# Patient Record
Sex: MALE | Race: WHITE | URBAN - METROPOLITAN AREA
[De-identification: names, ages, dates, MRNs, and addresses within clinical notes are randomized per-mention and may not be internally consistent; named-entity substitution may affect disease eponyms.]

---

## 2022-07-22 ENCOUNTER — APPOINTMENT (OUTPATIENT)
Dept: GENERAL RADIOLOGY | Age: 22
DRG: 812 | End: 2022-07-22
Payer: MEDICAID

## 2022-07-22 ENCOUNTER — HOSPITAL ENCOUNTER (INPATIENT)
Age: 22
LOS: 2 days | Discharge: HOME | DRG: 812 | End: 2022-07-24
Attending: EMERGENCY MEDICINE | Admitting: FAMILY MEDICINE
Payer: MEDICAID

## 2022-07-22 ENCOUNTER — APPOINTMENT (OUTPATIENT)
Dept: CT IMAGING | Age: 22
DRG: 812 | End: 2022-07-22
Payer: MEDICAID

## 2022-07-22 DIAGNOSIS — R09.02 HYPOXIA: ICD-10-CM

## 2022-07-22 DIAGNOSIS — T50.901A ACCIDENTAL DRUG OVERDOSE, INITIAL ENCOUNTER: Primary | ICD-10-CM

## 2022-07-22 DIAGNOSIS — R73.9 HYPERGLYCEMIA: ICD-10-CM

## 2022-07-22 PROBLEM — J69.0 ASPIRATION PNEUMONIA DUE TO INHALATION OF VOMITUS (HCC): Status: ACTIVE | Noted: 2022-07-22

## 2022-07-22 LAB
ACETAMINOPHEN LEVEL: <5 MCG/ML (ref 10–30)
ALBUMIN SERPL-MCNC: 4.5 G/DL (ref 3.5–5.2)
ALP BLD-CCNC: 127 U/L (ref 40–129)
ALT SERPL-CCNC: 23 U/L (ref 0–40)
AMPHETAMINE SCREEN, URINE: NOT DETECTED
ANION GAP SERPL CALCULATED.3IONS-SCNC: 10 MMOL/L (ref 7–16)
AST SERPL-CCNC: 26 U/L (ref 0–39)
B.E.: -1.2 MMOL/L (ref -3–3)
BARBITURATE SCREEN URINE: NOT DETECTED
BASOPHILS ABSOLUTE: 0.04 E9/L (ref 0–0.2)
BASOPHILS RELATIVE PERCENT: 0.3 % (ref 0–2)
BENZODIAZEPINE SCREEN, URINE: NOT DETECTED
BILIRUB SERPL-MCNC: 0.4 MG/DL (ref 0–1.2)
BUN BLDV-MCNC: 16 MG/DL (ref 6–20)
CALCIUM SERPL-MCNC: 8.6 MG/DL (ref 8.6–10.2)
CANNABINOID SCREEN URINE: NOT DETECTED
CHLORIDE BLD-SCNC: 96 MMOL/L (ref 98–107)
CO2: 27 MMOL/L (ref 22–29)
COCAINE METABOLITE SCREEN URINE: NOT DETECTED
COHB: 0.9 % (ref 0–1.5)
CREAT SERPL-MCNC: 1.6 MG/DL (ref 0.7–1.2)
CRITICAL: ABNORMAL
DATE ANALYZED: ABNORMAL
DATE OF COLLECTION: ABNORMAL
EKG ATRIAL RATE: 122 BPM
EKG P AXIS: 55 DEGREES
EKG P-R INTERVAL: 136 MS
EKG Q-T INTERVAL: 322 MS
EKG QRS DURATION: 100 MS
EKG QTC CALCULATION (BAZETT): 458 MS
EKG R AXIS: 75 DEGREES
EKG T AXIS: 47 DEGREES
EKG VENTRICULAR RATE: 122 BPM
EOSINOPHILS ABSOLUTE: 0.04 E9/L (ref 0.05–0.5)
EOSINOPHILS RELATIVE PERCENT: 0.3 % (ref 0–6)
ETHANOL: <10 MG/DL (ref 0–0.08)
FENTANYL SCREEN, URINE: POSITIVE
GFR AFRICAN AMERICAN: >60
GFR NON-AFRICAN AMERICAN: 53 ML/MIN/1.73
GLUCOSE BLD-MCNC: 386 MG/DL (ref 74–99)
HBA1C MFR BLD: 5.3 % (ref 4–5.6)
HCO3: 23.2 MMOL/L (ref 22–26)
HCT VFR BLD CALC: 49.7 % (ref 37–54)
HEMOGLOBIN: 16.5 G/DL (ref 12.5–16.5)
HHB: 3 % (ref 0–5)
IMMATURE GRANULOCYTES #: 0.13 E9/L
IMMATURE GRANULOCYTES %: 0.8 % (ref 0–5)
LAB: ABNORMAL
LACTIC ACID, SEPSIS: 1.2 MMOL/L (ref 0.5–1.9)
LACTIC ACID, SEPSIS: 2.9 MMOL/L (ref 0.5–1.9)
LYMPHOCYTES ABSOLUTE: 1.64 E9/L (ref 1.5–4)
LYMPHOCYTES RELATIVE PERCENT: 10.4 % (ref 20–42)
Lab: ABNORMAL
Lab: ABNORMAL
MCH RBC QN AUTO: 30.6 PG (ref 26–35)
MCHC RBC AUTO-ENTMCNC: 33.2 % (ref 32–34.5)
MCV RBC AUTO: 92.2 FL (ref 80–99.9)
METHADONE SCREEN, URINE: NOT DETECTED
METHB: 0.4 % (ref 0–1.5)
MODE: ABNORMAL
MONOCYTES ABSOLUTE: 0.39 E9/L (ref 0.1–0.95)
MONOCYTES RELATIVE PERCENT: 2.5 % (ref 2–12)
NEUTROPHILS ABSOLUTE: 13.49 E9/L (ref 1.8–7.3)
NEUTROPHILS RELATIVE PERCENT: 85.7 % (ref 43–80)
O2 CONTENT: 22.6 ML/DL
O2 SATURATION: 97 % (ref 92–98.5)
O2HB: 95.7 % (ref 94–97)
OPERATOR ID: 1721
OPIATE SCREEN URINE: NOT DETECTED
OXYCODONE URINE: NOT DETECTED
PATIENT TEMP: 37 C
PCO2: 38.3 MMHG (ref 35–45)
PDW BLD-RTO: 11.9 FL (ref 11.5–15)
PH BLOOD GAS: 7.4 (ref 7.35–7.45)
PHENCYCLIDINE SCREEN URINE: NOT DETECTED
PLATELET # BLD: 200 E9/L (ref 130–450)
PMV BLD AUTO: 9.8 FL (ref 7–12)
PO2: 93.4 MMHG (ref 75–100)
POTASSIUM SERPL-SCNC: 3.97 MMOL/L (ref 3.5–5)
POTASSIUM SERPL-SCNC: 4 MMOL/L (ref 3.5–5)
POTASSIUM SERPL-SCNC: 6.4 MMOL/L (ref 3.5–5)
RBC # BLD: 5.39 E12/L (ref 3.8–5.8)
SALICYLATE, SERUM: <0.3 MG/DL (ref 0–30)
SODIUM BLD-SCNC: 133 MMOL/L (ref 132–146)
SOURCE, BLOOD GAS: ABNORMAL
THB: 16.8 G/DL (ref 11.5–16.5)
TIME ANALYZED: 228
TOTAL PROTEIN: 7.6 G/DL (ref 6.4–8.3)
TRICYCLIC ANTIDEPRESSANTS SCREEN SERUM: NEGATIVE NG/ML
TROPONIN, HIGH SENSITIVITY: 15 NG/L (ref 0–11)
WBC # BLD: 15.7 E9/L (ref 4.5–11.5)

## 2022-07-22 PROCEDURE — 93005 ELECTROCARDIOGRAM TRACING: CPT | Performed by: EMERGENCY MEDICINE

## 2022-07-22 PROCEDURE — 2580000003 HC RX 258: Performed by: FAMILY MEDICINE

## 2022-07-22 PROCEDURE — 70450 CT HEAD/BRAIN W/O DYE: CPT

## 2022-07-22 PROCEDURE — 82805 BLOOD GASES W/O2 SATURATION: CPT

## 2022-07-22 PROCEDURE — 80143 DRUG ASSAY ACETAMINOPHEN: CPT

## 2022-07-22 PROCEDURE — 84132 ASSAY OF SERUM POTASSIUM: CPT

## 2022-07-22 PROCEDURE — 6360000002 HC RX W HCPCS: Performed by: EMERGENCY MEDICINE

## 2022-07-22 PROCEDURE — 87186 SC STD MICRODIL/AGAR DIL: CPT

## 2022-07-22 PROCEDURE — 2140000000 HC CCU INTERMEDIATE R&B

## 2022-07-22 PROCEDURE — 6360000002 HC RX W HCPCS: Performed by: FAMILY MEDICINE

## 2022-07-22 PROCEDURE — 80307 DRUG TEST PRSMV CHEM ANLYZR: CPT

## 2022-07-22 PROCEDURE — 2580000003 HC RX 258: Performed by: EMERGENCY MEDICINE

## 2022-07-22 PROCEDURE — 85025 COMPLETE CBC W/AUTO DIFF WBC: CPT

## 2022-07-22 PROCEDURE — 72125 CT NECK SPINE W/O DYE: CPT

## 2022-07-22 PROCEDURE — 87150 DNA/RNA AMPLIFIED PROBE: CPT

## 2022-07-22 PROCEDURE — 80053 COMPREHEN METABOLIC PANEL: CPT

## 2022-07-22 PROCEDURE — 96365 THER/PROPH/DIAG IV INF INIT: CPT

## 2022-07-22 PROCEDURE — 83605 ASSAY OF LACTIC ACID: CPT

## 2022-07-22 PROCEDURE — 84484 ASSAY OF TROPONIN QUANT: CPT

## 2022-07-22 PROCEDURE — 87077 CULTURE AEROBIC IDENTIFY: CPT

## 2022-07-22 PROCEDURE — 36415 COLL VENOUS BLD VENIPUNCTURE: CPT

## 2022-07-22 PROCEDURE — 99285 EMERGENCY DEPT VISIT HI MDM: CPT

## 2022-07-22 PROCEDURE — 80179 DRUG ASSAY SALICYLATE: CPT

## 2022-07-22 PROCEDURE — 6370000000 HC RX 637 (ALT 250 FOR IP): Performed by: FAMILY MEDICINE

## 2022-07-22 PROCEDURE — 87040 BLOOD CULTURE FOR BACTERIA: CPT

## 2022-07-22 PROCEDURE — 82077 ASSAY SPEC XCP UR&BREATH IA: CPT

## 2022-07-22 PROCEDURE — 71045 X-RAY EXAM CHEST 1 VIEW: CPT

## 2022-07-22 PROCEDURE — 83036 HEMOGLOBIN GLYCOSYLATED A1C: CPT

## 2022-07-22 RX ORDER — POLYETHYLENE GLYCOL 3350 17 G/17G
17 POWDER, FOR SOLUTION ORAL DAILY PRN
Status: DISCONTINUED | OUTPATIENT
Start: 2022-07-22 | End: 2022-07-24 | Stop reason: HOSPADM

## 2022-07-22 RX ORDER — ACETAMINOPHEN 325 MG/1
650 TABLET ORAL EVERY 6 HOURS PRN
Status: DISCONTINUED | OUTPATIENT
Start: 2022-07-22 | End: 2022-07-24 | Stop reason: HOSPADM

## 2022-07-22 RX ORDER — INSULIN LISPRO 100 [IU]/ML
0-8 INJECTION, SOLUTION INTRAVENOUS; SUBCUTANEOUS
Status: DISCONTINUED | OUTPATIENT
Start: 2022-07-22 | End: 2022-07-24 | Stop reason: HOSPADM

## 2022-07-22 RX ORDER — INSULIN GLARGINE-YFGN 100 [IU]/ML
0.25 INJECTION, SOLUTION SUBCUTANEOUS NIGHTLY
Status: DISCONTINUED | OUTPATIENT
Start: 2022-07-22 | End: 2022-07-24 | Stop reason: HOSPADM

## 2022-07-22 RX ORDER — ACETAMINOPHEN 650 MG/1
650 SUPPOSITORY RECTAL EVERY 6 HOURS PRN
Status: DISCONTINUED | OUTPATIENT
Start: 2022-07-22 | End: 2022-07-24 | Stop reason: HOSPADM

## 2022-07-22 RX ORDER — DEXTROSE MONOHYDRATE 100 MG/ML
INJECTION, SOLUTION INTRAVENOUS CONTINUOUS PRN
Status: DISCONTINUED | OUTPATIENT
Start: 2022-07-22 | End: 2022-07-24 | Stop reason: HOSPADM

## 2022-07-22 RX ORDER — PROMETHAZINE HYDROCHLORIDE 12.5 MG/1
12.5 TABLET ORAL EVERY 6 HOURS PRN
Status: DISCONTINUED | OUTPATIENT
Start: 2022-07-22 | End: 2022-07-24 | Stop reason: HOSPADM

## 2022-07-22 RX ORDER — INSULIN LISPRO 100 [IU]/ML
0-4 INJECTION, SOLUTION INTRAVENOUS; SUBCUTANEOUS NIGHTLY
Status: DISCONTINUED | OUTPATIENT
Start: 2022-07-22 | End: 2022-07-24 | Stop reason: HOSPADM

## 2022-07-22 RX ORDER — SODIUM CHLORIDE 9 MG/ML
INJECTION, SOLUTION INTRAVENOUS PRN
Status: DISCONTINUED | OUTPATIENT
Start: 2022-07-22 | End: 2022-07-24 | Stop reason: HOSPADM

## 2022-07-22 RX ORDER — SODIUM CHLORIDE 0.9 % (FLUSH) 0.9 %
10 SYRINGE (ML) INJECTION EVERY 12 HOURS SCHEDULED
Status: DISCONTINUED | OUTPATIENT
Start: 2022-07-22 | End: 2022-07-24 | Stop reason: HOSPADM

## 2022-07-22 RX ORDER — 0.9 % SODIUM CHLORIDE 0.9 %
500 INTRAVENOUS SOLUTION INTRAVENOUS ONCE
Status: COMPLETED | OUTPATIENT
Start: 2022-07-22 | End: 2022-07-22

## 2022-07-22 RX ORDER — SODIUM CHLORIDE 0.9 % (FLUSH) 0.9 %
10 SYRINGE (ML) INJECTION PRN
Status: DISCONTINUED | OUTPATIENT
Start: 2022-07-22 | End: 2022-07-24 | Stop reason: HOSPADM

## 2022-07-22 RX ORDER — ENOXAPARIN SODIUM 100 MG/ML
30 INJECTION SUBCUTANEOUS 2 TIMES DAILY
Status: DISCONTINUED | OUTPATIENT
Start: 2022-07-22 | End: 2022-07-24 | Stop reason: HOSPADM

## 2022-07-22 RX ORDER — ONDANSETRON 2 MG/ML
4 INJECTION INTRAMUSCULAR; INTRAVENOUS EVERY 6 HOURS PRN
Status: DISCONTINUED | OUTPATIENT
Start: 2022-07-22 | End: 2022-07-24 | Stop reason: HOSPADM

## 2022-07-22 RX ADMIN — SODIUM CHLORIDE 3000 MG: 900 INJECTION INTRAVENOUS at 23:40

## 2022-07-22 RX ADMIN — SODIUM CHLORIDE 3000 MG: 900 INJECTION INTRAVENOUS at 14:43

## 2022-07-22 RX ADMIN — ENOXAPARIN SODIUM 30 MG: 100 INJECTION SUBCUTANEOUS at 09:43

## 2022-07-22 RX ADMIN — SODIUM CHLORIDE, PRESERVATIVE FREE 10 ML: 5 INJECTION INTRAVENOUS at 21:06

## 2022-07-22 RX ADMIN — ENOXAPARIN SODIUM 30 MG: 100 INJECTION SUBCUTANEOUS at 21:06

## 2022-07-22 RX ADMIN — AMPICILLIN SODIUM AND SULBACTAM SODIUM 3000 MG: 2; 1 INJECTION, POWDER, FOR SOLUTION INTRAMUSCULAR; INTRAVENOUS at 02:51

## 2022-07-22 RX ADMIN — SODIUM CHLORIDE 500 ML: 9 INJECTION, SOLUTION INTRAVENOUS at 03:28

## 2022-07-22 RX ADMIN — ACETAMINOPHEN 650 MG: 325 TABLET ORAL at 15:12

## 2022-07-22 RX ADMIN — SODIUM CHLORIDE 40 ML: 9 INJECTION, SOLUTION INTRAVENOUS at 23:39

## 2022-07-22 RX ADMIN — SODIUM CHLORIDE 3000 MG: 900 INJECTION INTRAVENOUS at 09:43

## 2022-07-22 ASSESSMENT — PAIN SCALES - GENERAL: PAINLEVEL_OUTOF10: 6

## 2022-07-22 ASSESSMENT — PAIN - FUNCTIONAL ASSESSMENT: PAIN_FUNCTIONAL_ASSESSMENT: NONE - DENIES PAIN

## 2022-07-22 ASSESSMENT — PAIN DESCRIPTION - LOCATION: LOCATION: HEAD

## 2022-07-22 NOTE — ED NOTES
Assumed care of patient. Patient resting comfortably, denies complaints, lunch tray given.      Jessica Tipton RN  07/22/22 9941

## 2022-07-22 NOTE — PROGRESS NOTES
Hospitalist Progress Note      SYNOPSIS: Patient admitted on 7/22/2022 for fentanyl overdose and aspiration pneumonia. Mr. Andrew Byrne, a 23y.o. year old male  who  has no past medical history on file. Presented to the emergency department from local long-term for fentanyl overdose. Patient admits to smoking K2. Received total of 20 mg of Narcan. Patient hypoxic with pulse ox in the upper 80s. Vital signs reveal the patient to be tachycardic with a rate of 113. Currently 98%. Afebrile. Laboratory studies demonstrate creatinine 1.6, glucose 386, WBC 15.7. Imaging demonstrated groundglass densities in the lung apices due to aspiration, contusions ARDS or multifocal pneumonia. Patient was given Unasyn in the emergency department. Medicine was consulted for admission. SUBJECTIVE:    Patient seen and examined in the ER. Alert awake oriented x3 and providing most of the information. Denies any chest pain, nausea, vomiting. Shortness of breath better. Records reviewed. Stable overnight. No other overnight issues reported. No data recorded. DIET: ADULT DIET; Regular  CODE: Full Code  No intake or output data in the 24 hours ending 07/22/22 0756    OBJECTIVE:    /64   Pulse 88   Resp 20   Ht 5' 9\" (1.753 m)   Wt 250 lb (113.4 kg)   SpO2 96%   BMI 36.92 kg/m²     General appearance: No apparent distress, appears stated age and cooperative. HEENT:  Conjunctivae/corneas clear. Neck: Supple. No jugular venous distention. Respiratory: Clear to auscultation bilaterally, normal respiratory effort  Cardiovascular: Regular rate rhythm, normal S1-S2  Abdomen: Soft, nontender, nondistended  Musculoskeletal: No clubbing, cyanosis, no bilateral lower extremity edema. Brisk capillary refill.    Skin:  No rashes  on visible skin  Neurologic: awake, alert and following commands     ASSESSMENT:  -Aspiration pneumonia  -Acute hypoxic respiratory failure  -Fentanyl overdose  -Acute renal failure  -Leukocytosis  -Diabetes mellitus  Hyperkalemia, resolved        PLAN:  -Admit to medicine  -Telemetry  -Unasyn  -Monitor renal function  -Medium dose correction insulin  -Normal saline 75 mL/h  Patient denies being diabetic. Hold all insulin for now. Check hemoglobin A1c.      DISPOSITION:   If patient continues to improve with respect to his hypoxia, possible discharge on the morning of 7/23. Medications:  REVIEWED DAILY    Infusion Medications    sodium chloride      dextrose       Scheduled Medications    sodium chloride flush  10 mL IntraVENous 2 times per day    enoxaparin  30 mg SubCUTAneous BID    ampicillin-sulbactam  3,000 mg IntraVENous Q6H    insulin glargine  0.25 Units/kg SubCUTAneous Nightly    insulin lispro  0-8 Units SubCUTAneous TID WC    insulin lispro  0-4 Units SubCUTAneous Nightly     PRN Meds: sodium chloride flush, sodium chloride, promethazine **OR** ondansetron, polyethylene glycol, acetaminophen **OR** acetaminophen, glucose, dextrose bolus **OR** dextrose bolus, glucagon (rDNA), dextrose    Labs:     Recent Labs     07/22/22 0105   WBC 15.7*   HGB 16.5   HCT 49.7          Recent Labs     07/22/22  0105 07/22/22 0225 07/22/22 0228     --   --    K 6.4* 4.0 3.97   CL 96*  --   --    CO2 27  --   --    BUN 16  --   --    CREATININE 1.6*  --   --    CALCIUM 8.6  --   --        Recent Labs     07/22/22 0105   PROT 7.6   ALKPHOS 127   ALT 23   AST 26   BILITOT 0.4       No results for input(s): INR in the last 72 hours. No results for input(s): Valentin Saunders in the last 72 hours.     Chronic labs:    No results found for: CHOL, TRIG, HDL, LDLCALC, TSH, PSA, INR, LABA1C    Radiology: REVIEWED DAILY    +++++++++++++++++++++++++++++++++++++++++++++++++  Amber Guerrero MD  [facility] [place] Physician - [address] [place] [address]  +++++++++++++++++++++++++++++++++++++++++++++++++  NOTE: This report was transcribed using voice recognition software. Every effort was made to ensure accuracy; however, inadvertent computerized transcription errors may be present.

## 2022-07-22 NOTE — H&P
Hospitalist History & Physical      PCP: No primary care provider on file. Date of Service: Pt seen/examined on 7/22/2022     Chief Complaint:  had concerns including Drug Overdose (Pt reports he smoked K2 paper . Pt given 20 mg Narcan at [facility]). History Of Present Illness:    Mr. Scar Albright, a 23y.o. year old male  who  has no past medical history on file. Presented to the emergency department from local snf for fentanyl overdose. Patient admits to smoking K2. Received total of 20 mg of Narcan. Patient hypoxic with pulse ox in the upper 80s. Vital signs reveal the patient to be tachycardic with a rate of 113. Currently 98%. Afebrile. Laboratory studies demonstrate creatinine 1.6, glucose 386, WBC 15.7. Imaging demonstrated groundglass densities in the lung apices due to aspiration, contusions ARDS or multifocal pneumonia. Patient was given Unasyn in the emergency department. Medicine was consulted for admission. No past medical history on file. No past surgical history on file. Prior to Admission medications    Not on File         Allergies:  Patient has no known allergies. Social History:    TOBACCO:   has no history on file for tobacco use. ETOH:   has no history on file for alcohol use. Family History:    Reviewed in detail and negative for DM, CAD, Cancer, CVA. Positive as follows\"  No family history on file. REVIEW OF SYSTEMS:   Pertinent positives as noted in the HPI. All other systems reviewed and negative. PHYSICAL EXAM:  /74   Pulse (!) 113   Resp 24   Ht 5' 9\" (1.753 m)   Wt 250 lb (113.4 kg)   SpO2 98%   BMI 36.92 kg/m²   General appearance: No apparent distress  HEENT: Normal cephalic, contusion to forehead  Neck: Supple, with full range of motion. No jugular venous distention. Trachea midline.   Respiratory: Coarse bilaterally  Cardiovascular: Tachycardic  Abdomen: Soft, nontender, nondistended  Musculoskeletal: No clubbing, cyanosis, edema of bilateral lower extremities. Brisk capillary refill. Skin: Normal skin color. No rashes or lesions. Neurologic:  Neurovascularly intact without any focal sensory/motor       CBC:   Recent Labs     07/22/22 0105   WBC 15.7*   RBC 5.39   HGB 16.5   HCT 49.7   MCV 92.2   RDW 11.9        BMP:   Recent Labs     07/22/22 0105 07/22/22 0228     --    K 6.4* 3.97   CL 96*  --    CO2 27  --    BUN 16  --    CREATININE 1.6*  --      LFT:  Recent Labs     07/22/22 0105   PROT 7.6   ALKPHOS 127   ALT 23   AST 26   BILITOT 0.4     CE:  No results for input(s): Maya Risk in the last 72 hours. PT/INR: No results for input(s): INR, APTT in the last 72 hours. BNP: No results for input(s): BNP in the last 72 hours. ESR: No results found for: SEDRATE  CRP: No results found for: CRP  D Dimer: No results found for: DDIMER   Folate and B12: No results found for: ZEXJULIO19, No results found for: FOLATE  Lactic Acid: No results found for: LACTA  Thyroid Studies: No results found for: TSH, U9WWAMY, L5BIEMD, THYROIDAB    Oupatient labs:  No results found for: CHOL, TRIG, HDL, LDLCALC, TSH, PSA, INR, LABA1C    Urinalysis:  No results found for: NITRU, WBCUA, BACTERIA, RBCUA, BLOODU, SPECGRAV, GLUCOSEU    Imaging:  CT HEAD WO CONTRAST    Result Date: 7/22/2022  EXAMINATION: CT OF THE HEAD WITHOUT CONTRAST; CT OF THE CERVICAL SPINE WITHOUT CONTRAST 7/22/2022 1:24 am TECHNIQUE: CT of the head was performed without the administration of intravenous contrast. Automated exposure control, iterative reconstruction, and/or weight based adjustment of the mA/kV was utilized to reduce the radiation dose to as low as reasonably achievable.; CT of the cervical spine was performed without the administration of intravenous contrast. Multiplanar reformatted images are provided for review.  Automated exposure control, iterative reconstruction, and/or weight based adjustment of the mA/kV was utilized to reduce the radiation dose CT of the cervical spine was performed without the administration of intravenous contrast. Multiplanar reformatted images are provided for review. Automated exposure control, iterative reconstruction, and/or weight based adjustment of the mA/kV was utilized to reduce the radiation dose to as low as reasonably achievable. COMPARISON: None. HISTORY: ORDERING SYSTEM PROVIDED HISTORY: Headache and neck pain after fall TECHNOLOGIST PROVIDED HISTORY: Has a \"code stroke\" or \"stroke alert\" been called? ->No Reason for exam:->Evaluate intracranial abnormality Decision Support Exception - unselect if not a suspected or confirmed emergency medical condition->Emergency Medical Condition (MA) What reading provider will be dictating this exam?->CRC FINDINGS: Head: BRAIN/VENTRICLES: There is no acute intracranial hemorrhage, mass effect or midline shift. No abnormal extra-axial fluid collection. The gray-white differentiation is maintained without evidence of an acute infarct. There is no evidence of hydrocephalus. ORBITS: The bilateral globes are intact. SINUSES: There is a polyp versus retention cyst in the left maxillary sinus. Minimal mucoperiosteal thickening of the bilateral maxillary sinuses is noted. SOFT TISSUES/SKULL:  Nondisplaced fractures of the bilateral nasal and lacrimal bones are seen, of uncertain age. Cervical spine: BONES/ALIGNMENT: There is no acute fracture or traumatic malalignment. DEGENERATIVE CHANGES: No significant degenerative changes. SOFT TISSUES: There is no prevertebral soft tissue swelling. Ground-glass densities in the lung apices are partially included     No acute intracranial abnormality. No acute fracture or subluxation in the cervical spine. Ground-glass densities in the lung apices, due to aspiration, contusion, ARDS, or multifocal pneumonia. XR CHEST PORTABLE    Result Date: 7/22/2022  EXAMINATION: ONE XRAY VIEW OF THE CHEST 7/22/2022 1:32 am COMPARISON: None.  HISTORY: ORDERING SYSTEM PROVIDED HISTORY: overdose TECHNOLOGIST PROVIDED HISTORY: Reason for exam:->overdose What reading provider will be dictating this exam?->CRC FINDINGS: The lungs are without acute focal process. There is no effusion or pneumothorax. The cardiomediastinal silhouette is without acute process. The osseous structures are without acute process. No acute process. ASSESSMENT:  -Aspiration pneumonia  -Acute hypoxic respiratory failure  -Fentanyl overdose  -Acute renal failure  -Leukocytosis  -Diabetes mellitus      PLAN:  -Admit to medicine  -Telemetry  -Unasyn  -Monitor renal function  -Medium dose correction insulin  -Normal saline 75 mL/h        Diet: No diet orders on file  Code Status: No Order  Surrogate decision maker confirmed with patient:   No emergency contact information on file. DVT Prophylaxis: []Lovenox []Heparin []PCD [] [address] []Encouraged ambulation  Disposition: []Med/Surg [] Intermediate [] ICU/CCU  Admit status: [] Observation [] Inpatient     +++++++++++++++++++++++++++++++++++++++++++++++++  Higuera Mate, DO  +++++++++++++++++++++++++++++++++++++++++++++++++  NOTE: This report was transcribed using voice recognition software. Every effort was made to ensure accuracy; however, inadvertent computerized transcription errors may be present.

## 2022-07-22 NOTE — ED PROVIDER NOTES
appreciated. Musculoskeletal: Moves all extremities x 4. Warm and well perfused, no clubbing, cyanosis, or edema. Capillary refill <3 seconds  Skin: warm and dry. No rashes. Neurologic: GCS 15, CN 2-12 grossly intact, no focal deficits, symmetric strength 5/5 in the upper and lower extremities bilaterally  Psych: Normal Affect    -------------------------------------------------- RESULTS -------------------------------------------------  I have personally reviewed all laboratory and imaging results for this patient. Results are listed below.      LABS:  Results for orders placed or performed during the hospital encounter of 07/22/22   CBC with Auto Differential   Result Value Ref Range    WBC 15.7 (H) 4.5 - 11.5 E9/L    RBC 5.39 3.80 - 5.80 E12/L    Hemoglobin 16.5 12.5 - 16.5 g/dL    Hematocrit 49.7 37.0 - 54.0 %    MCV 92.2 80.0 - 99.9 fL    MCH 30.6 26.0 - 35.0 pg    MCHC 33.2 32.0 - 34.5 %    RDW 11.9 11.5 - 15.0 fL    Platelets 099 912 - 760 E9/L    MPV 9.8 7.0 - 12.0 fL    Neutrophils % 85.7 (H) 43.0 - 80.0 %    Immature Granulocytes % 0.8 0.0 - 5.0 %    Lymphocytes % 10.4 (L) 20.0 - 42.0 %    Monocytes % 2.5 2.0 - 12.0 %    Eosinophils % 0.3 0.0 - 6.0 %    Basophils % 0.3 0.0 - 2.0 %    Neutrophils Absolute 13.49 (H) 1.80 - 7.30 E9/L    Immature Granulocytes # 0.13 E9/L    Lymphocytes Absolute 1.64 1.50 - 4.00 E9/L    Monocytes Absolute 0.39 0.10 - 0.95 E9/L    Eosinophils Absolute 0.04 (L) 0.05 - 0.50 E9/L    Basophils Absolute 0.04 0.00 - 0.20 E9/L   Comprehensive Metabolic Panel   Result Value Ref Range    Sodium 133 132 - 146 mmol/L    Potassium 6.4 (H) 3.5 - 5.0 mmol/L    Chloride 96 (L) 98 - 107 mmol/L    CO2 27 22 - 29 mmol/L    Anion Gap 10 7 - 16 mmol/L    Glucose 386 (H) 74 - 99 mg/dL    BUN 16 6 - 20 mg/dL    Creatinine 1.6 (H) 0.7 - 1.2 mg/dL    GFR Non-African American 53 >=60 mL/min/1.73    GFR African American >60     Calcium 8.6 8.6 - 10.2 mg/dL    Total Protein 7.6 6.4 - 8.3 g/dL    Albumin regarding the diagnosis and prognosis. Questions are answered at this time and they are agreeable with the plan.       --------------------------------- IMPRESSION AND DISPOSITION ---------------------------------    IMPRESSION  1. Accidental drug overdose, initial encounter    2. Hypoxia    3. Hyperglycemia        DISPOSITION  Disposition: Admit to monitored bed  Patient condition is stable        NOTE: This report was transcribed using voice recognition software.  Every effort was made to ensure accuracy; however, inadvertent computerized transcription errors may be present          Sanchez Lopez MD  07/22/22 1329

## 2022-07-23 LAB
ACINETOBACTER CALCOAC BAUMANNII COMPLEX BY PCR: NOT DETECTED
ALBUMIN SERPL-MCNC: 4 G/DL (ref 3.5–5.2)
ALP BLD-CCNC: 104 U/L (ref 40–129)
ALT SERPL-CCNC: 18 U/L (ref 0–40)
ANION GAP SERPL CALCULATED.3IONS-SCNC: 12 MMOL/L (ref 7–16)
AST SERPL-CCNC: 20 U/L (ref 0–39)
BACTEROIDES FRAGILIS BY PCR: NOT DETECTED
BASOPHILS ABSOLUTE: 0.02 E9/L (ref 0–0.2)
BASOPHILS RELATIVE PERCENT: 0.3 % (ref 0–2)
BILIRUB SERPL-MCNC: 0.7 MG/DL (ref 0–1.2)
BOTTLE TYPE: ABNORMAL
BUN BLDV-MCNC: 10 MG/DL (ref 6–20)
CALCIUM SERPL-MCNC: 9.3 MG/DL (ref 8.6–10.2)
CANDIDA ALBICANS BY PCR: NOT DETECTED
CANDIDA AURIS BY PCR: NOT DETECTED
CANDIDA GLABRATA BY PCR: NOT DETECTED
CANDIDA KRUSEI BY PCR: NOT DETECTED
CANDIDA PARAPSILOSIS BY PCR: NOT DETECTED
CANDIDA TROPICALIS BY PCR: NOT DETECTED
CHLORIDE BLD-SCNC: 107 MMOL/L (ref 98–107)
CO2: 22 MMOL/L (ref 22–29)
CREAT SERPL-MCNC: 1 MG/DL (ref 0.7–1.2)
CRYPTOCOCCUS NEOFORMANS/GATTII BY PCR: NOT DETECTED
ENTEROBACTER CLOACAE COMPLEX BY PCR: NOT DETECTED
ENTEROBACTERALES BY PCR: NOT DETECTED
ENTEROCOCCUS FAECALIS BY PCR: NOT DETECTED
ENTEROCOCCUS FAECIUM BY PCR: NOT DETECTED
EOSINOPHILS ABSOLUTE: 0.12 E9/L (ref 0.05–0.5)
EOSINOPHILS RELATIVE PERCENT: 1.6 % (ref 0–6)
ESCHERICHIA COLI BY PCR: NOT DETECTED
GFR AFRICAN AMERICAN: >60
GFR NON-AFRICAN AMERICAN: >60 ML/MIN/1.73
GLUCOSE BLD-MCNC: 91 MG/DL (ref 74–99)
HAEMOPHILUS INFLUENZAE BY PCR: NOT DETECTED
HCT VFR BLD CALC: 44.1 % (ref 37–54)
HEMOGLOBIN: 15.2 G/DL (ref 12.5–16.5)
IMMATURE GRANULOCYTES #: 0.01 E9/L
IMMATURE GRANULOCYTES %: 0.1 % (ref 0–5)
KLEBSIELLA AEROGENES BY PCR: NOT DETECTED
KLEBSIELLA OXYTOCA BY PCR: NOT DETECTED
KLEBSIELLA PNEUMONIAE GROUP BY PCR: NOT DETECTED
LISTERIA MONOCYTOGENES BY PCR: NOT DETECTED
LYMPHOCYTES ABSOLUTE: 2.61 E9/L (ref 1.5–4)
LYMPHOCYTES RELATIVE PERCENT: 34.8 % (ref 20–42)
MCH RBC QN AUTO: 30.6 PG (ref 26–35)
MCHC RBC AUTO-ENTMCNC: 34.5 % (ref 32–34.5)
MCV RBC AUTO: 88.9 FL (ref 80–99.9)
METHICILLIN RESISTANCE MECA/C  BY PCR: NOT DETECTED
MONOCYTES ABSOLUTE: 0.68 E9/L (ref 0.1–0.95)
MONOCYTES RELATIVE PERCENT: 9.1 % (ref 2–12)
NEISSERIA MENINGITIDIS BY PCR: NOT DETECTED
NEUTROPHILS ABSOLUTE: 4.05 E9/L (ref 1.8–7.3)
NEUTROPHILS RELATIVE PERCENT: 54.1 % (ref 43–80)
ORDER NUMBER: ABNORMAL
PDW BLD-RTO: 12.3 FL (ref 11.5–15)
PLATELET # BLD: 186 E9/L (ref 130–450)
PMV BLD AUTO: 9.6 FL (ref 7–12)
POTASSIUM REFLEX MAGNESIUM: 4.2 MMOL/L (ref 3.5–5)
POTASSIUM SERPL-SCNC: 4.2 MMOL/L (ref 3.5–5)
PROTEUS SPECIES BY PCR: NOT DETECTED
PSEUDOMONAS AERUGINOSA BY PCR: NOT DETECTED
RBC # BLD: 4.96 E12/L (ref 3.8–5.8)
SALMONELLA SPECIES BY PCR: NOT DETECTED
SERRATIA MARCESCENS BY PCR: NOT DETECTED
SODIUM BLD-SCNC: 141 MMOL/L (ref 132–146)
SOURCE OF BLOOD CULTURE: ABNORMAL
STAPHYLOCOCCUS AUREUS BY PCR: NOT DETECTED
STAPHYLOCOCCUS EPIDERMIDIS BY PCR: DETECTED
STAPHYLOCOCCUS LUGDUNENSIS BY PCR: NOT DETECTED
STAPHYLOCOCCUS SPECIES BY PCR: DETECTED
STENOTROPHOMONAS MALTOPHILIA BY PCR: NOT DETECTED
STREPTOCOCCUS AGALACTIAE BY PCR: NOT DETECTED
STREPTOCOCCUS PNEUMONIAE BY PCR: NOT DETECTED
STREPTOCOCCUS PYOGENES  BY PCR: NOT DETECTED
STREPTOCOCCUS SPECIES BY PCR: NOT DETECTED
TOTAL PROTEIN: 7.3 G/DL (ref 6.4–8.3)
WBC # BLD: 7.5 E9/L (ref 4.5–11.5)

## 2022-07-23 PROCEDURE — 87077 CULTURE AEROBIC IDENTIFY: CPT

## 2022-07-23 PROCEDURE — 80048 BASIC METABOLIC PNL TOTAL CA: CPT

## 2022-07-23 PROCEDURE — 2580000003 HC RX 258: Performed by: FAMILY MEDICINE

## 2022-07-23 PROCEDURE — 87186 SC STD MICRODIL/AGAR DIL: CPT

## 2022-07-23 PROCEDURE — 2140000000 HC CCU INTERMEDIATE R&B

## 2022-07-23 PROCEDURE — 87070 CULTURE OTHR SPECIMN AEROBIC: CPT

## 2022-07-23 PROCEDURE — 6360000002 HC RX W HCPCS: Performed by: FAMILY MEDICINE

## 2022-07-23 PROCEDURE — 85025 COMPLETE CBC W/AUTO DIFF WBC: CPT

## 2022-07-23 PROCEDURE — 80053 COMPREHEN METABOLIC PANEL: CPT

## 2022-07-23 PROCEDURE — 36415 COLL VENOUS BLD VENIPUNCTURE: CPT

## 2022-07-23 PROCEDURE — 87206 SMEAR FLUORESCENT/ACID STAI: CPT

## 2022-07-23 RX ORDER — AMOXICILLIN AND CLAVULANATE POTASSIUM 875; 125 MG/1; MG/1
1 TABLET, FILM COATED ORAL 2 TIMES DAILY
Qty: 14 TABLET | Refills: 0 | Status: SHIPPED | OUTPATIENT
Start: 2022-07-23 | End: 2022-07-30

## 2022-07-23 RX ORDER — IBUPROFEN 600 MG/1
600 TABLET ORAL EVERY 8 HOURS PRN
Qty: 15 TABLET | Refills: 0 | Status: SHIPPED | OUTPATIENT
Start: 2022-07-23 | End: 2022-07-28

## 2022-07-23 RX ADMIN — SODIUM CHLORIDE 3000 MG: 900 INJECTION INTRAVENOUS at 18:27

## 2022-07-23 RX ADMIN — SODIUM CHLORIDE, PRESERVATIVE FREE 10 ML: 5 INJECTION INTRAVENOUS at 09:28

## 2022-07-23 RX ADMIN — SODIUM CHLORIDE, PRESERVATIVE FREE 10 ML: 5 INJECTION INTRAVENOUS at 21:30

## 2022-07-23 RX ADMIN — ENOXAPARIN SODIUM 30 MG: 100 INJECTION SUBCUTANEOUS at 09:27

## 2022-07-23 RX ADMIN — SODIUM CHLORIDE 40 ML/HR: 9 INJECTION, SOLUTION INTRAVENOUS at 06:22

## 2022-07-23 RX ADMIN — SODIUM CHLORIDE 3000 MG: 900 INJECTION INTRAVENOUS at 11:31

## 2022-07-23 RX ADMIN — SODIUM CHLORIDE 3000 MG: 900 INJECTION INTRAVENOUS at 06:25

## 2022-07-23 NOTE — PROGRESS NOTES
Dr. Iva Simmons made aware of guards concern about pt coughing up blood. Ok to send pulmonology prior to discharge. New consult sent to Dr. Sosa Aden.

## 2022-07-23 NOTE — CONSULTS
Nenita Zarate M.D.,[facility]  Yo Vicente D.O., F.A.C.O.I., Fabricio Rg M.D. Mildred Gonzalez M.D. Cecilia Loja D.O. Patient:  Blanka Parker 23 y.o. male MRN: [id number]     Date of Service: 7/23/2022      PULMONARY CONSULTATION    Reason for Consultation: Hemoptysis  Referring Physician: Dr. Ed Dang    Communication with the referring physician will be sent via the electronic medical record. Chief Complaint: Fentanyl overdose    CODE STATUS: Full    SUBJECTIVE:  HPI:  Blanka Parker is a 23 y.o. male not previously known to our practice. He denies any lung history occluding COPD, asthma, SHILOH, inhaler or oxygen use. No past medical history on file    Patient presented to the emergency department for fentanyl drug overdose and possible aspiration pneumonia. CXR was negative for acute pulmonary process but he was experiencing some hemoptysis with coughing so pulmonary was consulted prior to discharge. Lung apices from CT neck revealed GGO d/t aspiration. Patient seen and examined. Laying in bed on room air no acute distress. Patient admits to right lower rib pain with coughing. Patient also admits to quarter sized hemoptysis with coughing. Sample evaluated at bedside. Patient states this has been happening for the past 3 days. He states he is able to cough up blood consistently but not more than quarter sized at a time. Denies fever, chills, nausea, vomiting, diarrhea, change in appetite. No known sick contacts although patient does reside in longterm. History reviewed. No pertinent past medical history. History reviewed. No pertinent surgical history. No family history on file.     Social History:   Social History     Socioeconomic History    Marital status: Single     Spouse name: Not on file    Number of children: Not on file    Years of education: Not on file    Highest education level: Not on file   Occupational History    Not on file   Tobacco Use    Smoking status: Never Smokeless tobacco: Never   Substance and Sexual Activity    Alcohol use: Not Currently    Drug use: Yes     Types: Marijuana Danika Eglin)    Sexual activity: Not on file   Other Topics Concern    Not on file   Social History Narrative    Not on file     Social Determinants of Health     Financial Resource Strain: Not on file   Food Insecurity: Not on file   Transportation Needs: Not on file   Physical Activity: Not on file   Stress: Not on file   Social Connections: Not on file   Intimate Partner Violence: Not on file   Housing Stability: Not on file     Smoking history: The patient smokes marijuana    ETOH:   reports that he does not currently use alcohol. Exposures: There  is not history of TB or TB exposure. There is not asbestos or silica dust exposure. The patient reports does not have coal, foundry, quarry or [employer] exposure. Recent travel history none. There is  history of recreational or IV drug use. There is not hot tub exposure. The patient does not have any exotic pets, turtles or exotic birds. Patient resides in [facility]. Vaccines: There is no immunization history on file for this patient. Home Meds: No medications prior to admission.     CURRENT MEDS :  Scheduled Meds:   sodium chloride flush  10 mL IntraVENous 2 times per day    [Held by provider] enoxaparin  30 mg SubCUTAneous BID    ampicillin-sulbactam  3,000 mg IntraVENous Q6H    [Held by provider] insulin glargine  0.25 Units/kg SubCUTAneous Nightly    [Held by provider] insulin lispro  0-8 Units SubCUTAneous TID WC    [Held by provider] insulin lispro  0-4 Units SubCUTAneous Nightly       Continuous Infusions:   sodium chloride 40 mL/hr (07/23/22 0622)    dextrose         No Known Allergies    REVIEW OF SYSTEMS:  Constitutional: Denies fever, weight loss, night sweats, and fatigue  Skin: Denies pigmentation, dark lesions, and rashes   HEENT: Denies hearing loss, tinnitus, ear drainage, epistaxis, sore throat, and hoarseness. Cardiovascular: Denies palpitations, chest pain, and chest pressure.  + Right lower rib pain  Respiratory: Denies dyspnea at rest, apnea, and choking.  + Cough with hemoptysis  Gastrointestinal: Denies nausea, vomiting, poor appetite, diarrhea, heartburn or reflux  Genitourinary: Denies dysuria, frequency, urgency or hematuria  Musculoskeletal: Denies myalgias, muscle weakness, and bone pain  Neurological: Denies dizziness, vertigo, headache, and focal weakness  Psychological: Denies anxiety and depression  Endocrine: Denies heat intolerance and cold intolerance  Hematopoietic/Lymphatic: Denies bleeding problems and blood transfusions    OBJECTIVE:   /71   Pulse 69   Temp 98 °F (36.7 °C) (Temporal)   Resp 18   Ht 5' 9\" (1.753 m)   Wt 250 lb (113.4 kg)   SpO2 97%   BMI 36.92 kg/m²   SpO2 Readings from Last 1 Encounters:   07/23/22 97%        I/O:    Intake/Output Summary (Last 24 hours) at 7/23/2022 1235  Last data filed at 7/23/2022 0830  Gross per 24 hour   Intake 120 ml   Output --   Net 120 ml        Physical Exam:  General: The patient is lying in bed comfortably without any distress. Breathing is not labored  HEENT: Pupils are equal round and reactive to light, there are no oral lesions and no post-nasal drip   Neck: supple without adenopathy  Cardiovascular: regular rate and rhythm without murmur or gallop  Respiratory: Clear to auscultation bilaterally without wheezing or crackles. Air entry is symmetric  Abdomen: soft, non-tender, non-distended, normal bowel sounds  Extremities: warm, no edema, no clubbing  Skin: no rash or lesion + multiple tattoos  Neurologic: CN II-XII grossly intact, no focal deficits    Pulmonary Function Testing   None on file    Imaging personally reviewed:  CT neck lung windows 7/22  Ground-glass densities in the lung apices, due to aspiration, contusion,   ARDS, or multifocal pneumonia. CXR 7/22  The lungs are without acute focal process.   There is no effusion or   pneumothorax. The cardiomediastinal silhouette is without acute process. The   osseous structures are without acute process. Echo none on file    Labs:  Lab Results   Component Value Date/Time    WBC 7.5 07/23/2022 05:43 AM    HGB 15.2 07/23/2022 05:43 AM    HCT 44.1 07/23/2022 05:43 AM    MCV 88.9 07/23/2022 05:43 AM    MCH 30.6 07/23/2022 05:43 AM    MCHC 34.5 07/23/2022 05:43 AM    RDW 12.3 07/23/2022 05:43 AM     07/23/2022 05:43 AM    MPV 9.6 07/23/2022 05:43 AM     Lab Results   Component Value Date/Time     07/23/2022 05:43 AM    K 4.2 07/23/2022 05:43 AM    K 4.2 07/23/2022 05:43 AM     07/23/2022 05:43 AM    CO2 22 07/23/2022 05:43 AM    BUN 10 07/23/2022 05:43 AM    CREATININE 1.0 07/23/2022 05:43 AM    LABALBU 4.0 07/23/2022 05:43 AM    CALCIUM 9.3 07/23/2022 05:43 AM    GFRAA >60 07/23/2022 05:43 AM    LABGLOM >60 07/23/2022 05:43 AM     No results found for: PROTIME, INR  No results for input(s): PROBNP in the last 72 hours. No results for input(s): TROPONINI in the last 72 hours. No results for input(s): PROCAL in the last 72 hours. This SmartLink has not been configured with any valid records. Micro:  7/22 blood cultures pending    Assessment:  Hemoptysis  Aspiration pneumonia 2/2 fentanyl overdose  Drug abuse  Marijuana abuse    Plan:  Chest x-ray negative for acute pulmonary process  Antibiotics for aspiration pneumonia: Unasyn. DC on Augmentin per PCP  Obtain respiratory culture  Obtain CTA pulmonary for hemoptysis  Hold Lovenox  Substance abuse cessation education      Thank you for allowing me to participate in the care of Sedrick Robison. Please feel free to call with questions. This plan of care was reviewed in collaboration with Dr. Vickie Pintos    Electronically signed by JUDY Tavares CNP on 7/23/2022 at 12:35 PM      Note: This report was completed utilizing computer voice recognition software.  Every effort has been made to ensure accuracy, however; inadvertent computerized transcription errors may be present    I personally saw, examined, and cared for the patient. Labs, medications, radiographs reviewed. I agree with history exam and plans detailed in NP note with the following additions:    Admitted for aspiration pneumonia d/t drug overdose. He is on antibiotics and CXR actually looks quite good. He reports coughing up a small amount of blood for which we recommended CTA of the chest which he refused. Hemoptysis has resolved. He may discharge. Pulmonary follow up only if return of hemoptysis.     Chitra Garza MD

## 2022-07-23 NOTE — DISCHARGE SUMMARY
Hospitalist Discharge Summary    Patient ID: Sonia Ortiz   Patient : 2000  Patient's PCP: No primary care provider on file. Admit Date: 2022   Admitting Physician: Albaro Lopez DO    Discharge Date:  2022   Discharge Physician: Sully Holt MD   Discharge Condition: Stable  Discharge Disposition: [facility] course in brief:  (Please refer to daily progress notes for a comprehensive review of the hospitalization by requesting medical records)     Patient admitted on 2022 for fentanyl overdose and aspiration pneumonia. Mr. Sonia Ortiz, a 23y.o. year old male  who  has no past medical history on file. Presented to the emergency department from local alf for fentanyl overdose. Patient admits to smoking K2. Received total of 20 mg of Narcan. Patient hypoxic with pulse ox in the upper 80s. Vital signs reveal the patient to be tachycardic with a rate of 113. Currently 98%. Afebrile. Laboratory studies demonstrate creatinine 1.6, glucose 386, WBC 15.7. Imaging demonstrated groundglass densities in the lung apices due to aspiration, contusions ARDS or multifocal pneumonia. Patient was given Unasyn in the emergency department. Medicine was consulted for admission. He was started on IV Unasyn for aspiration pneumonia with significant improvement in hypoxia and physical symptoms by the morning of . Patient medically stable for discharge on  with Augmentin for 7 days. Initial admission orders started him on insulin but patient denied being diabetic. I checked hemoglobin A1c level which was 5.3, ruling out any possibility of diabetes. Patient complains of generalized chest aches. Give him ibuprofen as needed for 3 to 5 days. He did mention blood in the sputum at the time of discharge, but upon physical evaluation of the sample it was tiny dots of blood in the sputum, possibly from excessive coughing.     Consults:   None    Discharge Diagnoses:    ASSESSMENT:  -Aspiration pneumonia  -Acute hypoxic respiratory failure  -Fentanyl overdose  -Acute renal failure  -Leukocytosis  -Diabetes mellitus  Hyperkalemia, resolved        PLAN:  -Admit to medicine  -Telemetry  -Unasyn  -Monitor renal function  -Medium dose correction insulin  -Normal saline 75 mL/h  Patient denies being diabetic. Hold all insulin for now. Check hemoglobin A1c. Discharge Instructions / Follow up:    No future appointments. Continued appropriate risk factor modification of blood pressure, diabetes and serum lipids will remain essential to reducing risk of future atherosclerotic development    Activity: activity as tolerated    Significant labs:  CBC:   Recent Labs     07/22/22 0105 07/23/22  0543   WBC 15.7* 7.5   RBC 5.39 4.96   HGB 16.5 15.2   HCT 49.7 44.1   MCV 92.2 88.9   RDW 11.9 12.3    186     BMP:   Recent Labs     07/22/22 0105 07/22/22 0225 07/22/22 0228 07/23/22  0543     --   --  141   K 6.4* 4.0 3.97 4.2   CL 96*  --   --  107   CO2 27  --   --  22   BUN 16  --   --  10   CREATININE 1.6*  --   --  1.0     LFT:  Recent Labs     07/22/22 0105 07/23/22  0543   PROT 7.6 7.3   ALKPHOS 127 104   ALT 23 18   AST 26 20   BILITOT 0.4 0.7     PT/INR: No results for input(s): INR, APTT in the last 72 hours. BNP: No results for input(s): BNP in the last 72 hours.   Hgb A1C:   Lab Results   Component Value Date    LABA1C 5.3 07/22/2022     Folate and B12: No results found for: Vertie Rho, No results found for: FOLATE  Thyroid Studies: No results found for: TSH, F8PSUBE, I1HGKDW, THYROIDAB    Urinalysis:  No results found for: NITRU, WBCUA, BACTERIA, RBCUA, BLOODU, SPECGRAV, GLUCOSEU    Imaging:  CT HEAD WO CONTRAST    Result Date: 7/22/2022  EXAMINATION: CT OF THE HEAD WITHOUT CONTRAST; CT OF THE CERVICAL SPINE WITHOUT CONTRAST 7/22/2022 1:24 am TECHNIQUE: CT of the head was performed without the administration of intravenous contrast. Automated exposure control, iterative reconstruction, and/or weight based adjustment of the mA/kV was utilized to reduce the radiation dose to as low as reasonably achievable.; CT of the cervical spine was performed without the administration of intravenous contrast. Multiplanar reformatted images are provided for review. Automated exposure control, iterative reconstruction, and/or weight based adjustment of the mA/kV was utilized to reduce the radiation dose to as low as reasonably achievable. COMPARISON: None. HISTORY: ORDERING SYSTEM PROVIDED HISTORY: Headache and neck pain after fall TECHNOLOGIST PROVIDED HISTORY: Has a \"code stroke\" or \"stroke alert\" been called? ->No Reason for exam:->Evaluate intracranial abnormality Decision Support Exception - unselect if not a suspected or confirmed emergency medical condition->Emergency Medical Condition ([place]) What reading provider will be dictating this exam?->CRC FINDINGS: Head: BRAIN/VENTRICLES: There is no acute intracranial hemorrhage, mass effect or midline shift. No abnormal extra-axial fluid collection. The gray-white differentiation is maintained without evidence of an acute infarct. There is no evidence of hydrocephalus. ORBITS: The bilateral globes are intact. SINUSES: There is a polyp versus retention cyst in the left maxillary sinus. Minimal mucoperiosteal thickening of the bilateral maxillary sinuses is noted. SOFT TISSUES/SKULL:  Nondisplaced fractures of the bilateral nasal and lacrimal bones are seen, of uncertain age. Cervical spine: BONES/ALIGNMENT: There is no acute fracture or traumatic malalignment. DEGENERATIVE CHANGES: No significant degenerative changes. SOFT TISSUES: There is no prevertebral soft tissue swelling. Ground-glass densities in the lung apices are partially included     No acute intracranial abnormality. No acute fracture or subluxation in the cervical spine.  Ground-glass densities in the lung apices, due to aspiration, contusion, ARDS, or multifocal pneumonia. CT CERVICAL SPINE WO CONTRAST    Result Date: 7/22/2022  EXAMINATION: CT OF THE HEAD WITHOUT CONTRAST; CT OF THE CERVICAL SPINE WITHOUT CONTRAST 7/22/2022 1:24 am TECHNIQUE: CT of the head was performed without the administration of intravenous contrast. Automated exposure control, iterative reconstruction, and/or weight based adjustment of the mA/kV was utilized to reduce the radiation dose to as low as reasonably achievable.; CT of the cervical spine was performed without the administration of intravenous contrast. Multiplanar reformatted images are provided for review. Automated exposure control, iterative reconstruction, and/or weight based adjustment of the mA/kV was utilized to reduce the radiation dose to as low as reasonably achievable. COMPARISON: None. HISTORY: ORDERING SYSTEM PROVIDED HISTORY: Headache and neck pain after fall TECHNOLOGIST PROVIDED HISTORY: Has a \"code stroke\" or \"stroke alert\" been called? ->No Reason for exam:->Evaluate intracranial abnormality Decision Support Exception - unselect if not a suspected or confirmed emergency medical condition->Emergency Medical Condition ([place]) What reading provider will be dictating this exam?->CRC FINDINGS: Head: BRAIN/VENTRICLES: There is no acute intracranial hemorrhage, mass effect or midline shift. No abnormal extra-axial fluid collection. The gray-white differentiation is maintained without evidence of an acute infarct. There is no evidence of hydrocephalus. ORBITS: The bilateral globes are intact. SINUSES: There is a polyp versus retention cyst in the left maxillary sinus. Minimal mucoperiosteal thickening of the bilateral maxillary sinuses is noted. SOFT TISSUES/SKULL:  Nondisplaced fractures of the bilateral nasal and lacrimal bones are seen, of uncertain age. Cervical spine: BONES/ALIGNMENT: There is no acute fracture or traumatic malalignment. DEGENERATIVE CHANGES: No significant degenerative changes.  SOFT TISSUES: There is no prevertebral soft tissue swelling. Ground-glass densities in the lung apices are partially included     No acute intracranial abnormality. No acute fracture or subluxation in the cervical spine. Ground-glass densities in the lung apices, due to aspiration, contusion, ARDS, or multifocal pneumonia. XR CHEST PORTABLE    Result Date: 7/22/2022  EXAMINATION: ONE XRAY VIEW OF THE CHEST 7/22/2022 1:32 am COMPARISON: None. HISTORY: ORDERING SYSTEM PROVIDED HISTORY: overdose TECHNOLOGIST PROVIDED HISTORY: Reason for exam:->overdose What reading provider will be dictating this exam?->CRC FINDINGS: The lungs are without acute focal process. There is no effusion or pneumothorax. The cardiomediastinal silhouette is without acute process. The osseous structures are without acute process. No acute process. Discharge Medications:      Medication List        START taking these medications      amoxicillin-clavulanate 875-125 MG per tablet  Commonly known as: AUGMENTIN  Take 1 tablet by mouth in the morning and 1 tablet before bedtime. Do all this for 7 days. ibuprofen 600 MG tablet  Commonly known as: ADVIL;MOTRIN  Take 1 tablet by mouth every 8 hours as needed for Pain               Where to Get Your Medications        You can get these medications from any pharmacy    Bring a paper prescription for each of these medications  amoxicillin-clavulanate 875-125 MG per tablet  ibuprofen 600 MG tablet         Time Spent on discharge is more than 45 minutes in the examination, evaluation, counseling and review of medications and discharge plan.    +++++++++++++++++++++++++++++++++++++++++++++++++  Kai Castillo MD  [address]  +++++++++++++++++++++++++++++++++++++++++++++++++  NOTE: This report was transcribed using voice recognition software.  Every effort was made to ensure accuracy; however, inadvertent computerized

## 2022-07-24 VITALS
TEMPERATURE: 97.9 F | OXYGEN SATURATION: 98 % | DIASTOLIC BLOOD PRESSURE: 82 MMHG | HEART RATE: 65 BPM | RESPIRATION RATE: 18 BRPM | BODY MASS INDEX: 37.03 KG/M2 | WEIGHT: 250 LBS | HEIGHT: 69 IN | SYSTOLIC BLOOD PRESSURE: 127 MMHG

## 2022-07-24 LAB
ALBUMIN SERPL-MCNC: 4.4 G/DL (ref 3.5–5.2)
ALP BLD-CCNC: 103 U/L (ref 40–129)
ALT SERPL-CCNC: 16 U/L (ref 0–40)
ANION GAP SERPL CALCULATED.3IONS-SCNC: 11 MMOL/L (ref 7–16)
AST SERPL-CCNC: 17 U/L (ref 0–39)
BASOPHILS ABSOLUTE: 0.02 E9/L (ref 0–0.2)
BASOPHILS RELATIVE PERCENT: 0.2 % (ref 0–2)
BILIRUB SERPL-MCNC: 0.8 MG/DL (ref 0–1.2)
BUN BLDV-MCNC: 12 MG/DL (ref 6–20)
CALCIUM SERPL-MCNC: 9.6 MG/DL (ref 8.6–10.2)
CHLORIDE BLD-SCNC: 102 MMOL/L (ref 98–107)
CO2: 25 MMOL/L (ref 22–29)
CREAT SERPL-MCNC: 1.1 MG/DL (ref 0.7–1.2)
EOSINOPHILS ABSOLUTE: 0.14 E9/L (ref 0.05–0.5)
EOSINOPHILS RELATIVE PERCENT: 1.6 % (ref 0–6)
GFR AFRICAN AMERICAN: >60
GFR NON-AFRICAN AMERICAN: >60 ML/MIN/1.73
GLUCOSE BLD-MCNC: 88 MG/DL (ref 74–99)
HCT VFR BLD CALC: 42.7 % (ref 37–54)
HEMOGLOBIN: 14.9 G/DL (ref 12.5–16.5)
IMMATURE GRANULOCYTES #: 0.02 E9/L
IMMATURE GRANULOCYTES %: 0.2 % (ref 0–5)
LYMPHOCYTES ABSOLUTE: 2.46 E9/L (ref 1.5–4)
LYMPHOCYTES RELATIVE PERCENT: 28.8 % (ref 20–42)
MCH RBC QN AUTO: 30.3 PG (ref 26–35)
MCHC RBC AUTO-ENTMCNC: 34.9 % (ref 32–34.5)
MCV RBC AUTO: 87 FL (ref 80–99.9)
MONOCYTES ABSOLUTE: 0.86 E9/L (ref 0.1–0.95)
MONOCYTES RELATIVE PERCENT: 10.1 % (ref 2–12)
NEUTROPHILS ABSOLUTE: 5.04 E9/L (ref 1.8–7.3)
NEUTROPHILS RELATIVE PERCENT: 59.1 % (ref 43–80)
PDW BLD-RTO: 12 FL (ref 11.5–15)
PLATELET # BLD: 191 E9/L (ref 130–450)
PMV BLD AUTO: 9.6 FL (ref 7–12)
POTASSIUM REFLEX MAGNESIUM: 4.1 MMOL/L (ref 3.5–5)
RBC # BLD: 4.91 E12/L (ref 3.8–5.8)
SODIUM BLD-SCNC: 138 MMOL/L (ref 132–146)
TOTAL PROTEIN: 7.5 G/DL (ref 6.4–8.3)
WBC # BLD: 8.5 E9/L (ref 4.5–11.5)

## 2022-07-24 PROCEDURE — 6360000002 HC RX W HCPCS: Performed by: FAMILY MEDICINE

## 2022-07-24 PROCEDURE — 80053 COMPREHEN METABOLIC PANEL: CPT

## 2022-07-24 PROCEDURE — 2580000003 HC RX 258: Performed by: FAMILY MEDICINE

## 2022-07-24 PROCEDURE — 85025 COMPLETE CBC W/AUTO DIFF WBC: CPT

## 2022-07-24 PROCEDURE — 36415 COLL VENOUS BLD VENIPUNCTURE: CPT

## 2022-07-24 RX ADMIN — SODIUM CHLORIDE 3000 MG: 900 INJECTION INTRAVENOUS at 00:50

## 2022-07-24 RX ADMIN — SODIUM CHLORIDE 40 ML: 9 INJECTION, SOLUTION INTRAVENOUS at 07:10

## 2022-07-24 RX ADMIN — SODIUM CHLORIDE 80 ML/HR: 9 INJECTION, SOLUTION INTRAVENOUS at 00:47

## 2022-07-24 RX ADMIN — SODIUM CHLORIDE 3000 MG: 900 INJECTION INTRAVENOUS at 07:12

## 2022-07-24 NOTE — PROGRESS NOTES
Went into to assess pt. He states he no longer wants a CTA he feels brand new and he would like to be discharged. Dr. mOkar Farah via perfect [employer].

## 2022-07-24 NOTE — DISCHARGE SUMMARY
Hospitalist Discharge Summary    Patient ID: Claudio Wong   Patient : 2000  Patient's PCP: No primary care provider on file. Admit Date: 2022   Admitting Physician: Waldo Cai DO    Discharge Date:  2022   Discharge Physician: Rere Summers MD   Discharge Condition: Stable  Discharge Disposition: [facility] course in brief:  (Please refer to daily progress notes for a comprehensive review of the hospitalization by requesting medical records)     Patient admitted on 2022 for fentanyl overdose and aspiration pneumonia. Mr. Claudio Wong, a 23y.o. year old male  who  has no past medical history on file. Presented to the emergency department from local [facility] for fentanyl overdose. Patient admits to smoking K2. Received total of 20 mg of Narcan. Patient hypoxic with pulse ox in the upper 80s. Vital signs reveal the patient to be tachycardic with a rate of 113. Currently 98%. Afebrile. Laboratory studies demonstrate creatinine 1.6, glucose 386, WBC 15.7. Imaging demonstrated groundglass densities in the lung apices due to aspiration, contusions ARDS or multifocal pneumonia. Patient was given Unasyn in the emergency department. Medicine was consulted for admission. He was started on IV Unasyn for aspiration pneumonia with significant improvement in hypoxia and physical symptoms by the morning of . Patient medically stable for discharge on  with Augmentin for 7 days. Initial admission orders started him on insulin but patient denied being diabetic. I checked hemoglobin A1c level which was 5.3, ruling out any possibility of diabetes. Patient complains of generalized chest aches. Give him ibuprofen as needed for 3 to 5 days. He did mention blood in the sputum at the time of discharge, but upon physical evaluation of the sample it was tiny dots of blood in the sputum, possibly from excessive coughing.   Patient was not reissued so we consulted pulmonology who recommended and ordered a CTA chest.  On the morning of 7/24, patient refused to undergo CTA chest as his blood in the sputum resolved and he felt much better. Medically stable for discharge on 7/24 back to his facility. Consults:   IP CONSULT TO PULMONOLOGY    Discharge Diagnoses:    ASSESSMENT:  -Aspiration pneumonia  -Acute hypoxic respiratory failure  -Fentanyl overdose  -Acute renal failure  -Leukocytosis  -Diabetes mellitus  Hyperkalemia, resolved        PLAN:  -Admit to medicine  -Telemetry  -Unasyn  -Monitor renal function  -Medium dose correction insulin  -Normal saline 75 mL/h  Patient denies being diabetic. Hold all insulin for now. Check hemoglobin A1c. Discharge Instructions / Follow up:    No future appointments. Continued appropriate risk factor modification of blood pressure, diabetes and serum lipids will remain essential to reducing risk of future atherosclerotic development    Activity: activity as tolerated    Significant labs:  CBC:   Recent Labs     07/22/22 0105 07/23/22  0543 07/24/22  0553   WBC 15.7* 7.5 8.5   RBC 5.39 4.96 4.91   HGB 16.5 15.2 14.9   HCT 49.7 44.1 42.7   MCV 92.2 88.9 87.0   RDW 11.9 12.3 12.0    186 191     BMP:   Recent Labs     07/22/22 0105 07/22/22  0225 07/23/22  0543 07/24/22  0553     --  141 138   K 6.4*   < > 4.2  4.2 4.1   CL 96*  --  107 102   CO2 27  --  22 25   BUN 16  --  10 12   CREATININE 1.6*  --  1.0 1.1    < > = values in this interval not displayed. LFT:  Recent Labs     07/22/22 0105 07/23/22  0543 07/24/22  0553   PROT 7.6 7.3 7.5   ALKPHOS 127 104 103   ALT 23 18 16   AST 26 20 17   BILITOT 0.4 0.7 0.8     PT/INR: No results for input(s): INR, APTT in the last 72 hours. BNP: No results for input(s): BNP in the last 72 hours.   Hgb A1C:   Lab Results   Component Value Date    LABA1C 5.3 07/22/2022     Folate and B12: No results found for: OYSWSAUC96, No results found for: FOLATE  Thyroid Studies: No results found for: TSH, E6TGBFH, W7ZWMXC, THYROIDAB    Urinalysis:  No results found for: NITRU, WBCUA, BACTERIA, RBCUA, BLOODU, SPECGRAV, GLUCOSEU    Imaging:  CT HEAD WO CONTRAST    Result Date: 7/22/2022  EXAMINATION: CT OF THE HEAD WITHOUT CONTRAST; CT OF THE CERVICAL SPINE WITHOUT CONTRAST 7/22/2022 1:24 am TECHNIQUE: CT of the head was performed without the administration of intravenous contrast. Automated exposure control, iterative reconstruction, and/or weight based adjustment of the mA/kV was utilized to reduce the radiation dose to as low as reasonably achievable.; CT of the cervical spine was performed without the administration of intravenous contrast. Multiplanar reformatted images are provided for review. Automated exposure control, iterative reconstruction, and/or weight based adjustment of the mA/kV was utilized to reduce the radiation dose to as low as reasonably achievable. COMPARISON: None. HISTORY: ORDERING SYSTEM PROVIDED HISTORY: Headache and neck pain after fall TECHNOLOGIST PROVIDED HISTORY: Has a \"code stroke\" or \"stroke alert\" been called? ->No Reason for exam:->Evaluate intracranial abnormality Decision Support Exception - unselect if not a suspected or confirmed emergency medical condition->Emergency Medical Condition (MA) What reading provider will be dictating this exam?->CRC FINDINGS: Head: BRAIN/VENTRICLES: There is no acute intracranial hemorrhage, mass effect or midline shift. No abnormal extra-axial fluid collection. The gray-white differentiation is maintained without evidence of an acute infarct. There is no evidence of hydrocephalus. ORBITS: The bilateral globes are intact. SINUSES: There is a polyp versus retention cyst in the left maxillary sinus. Minimal mucoperiosteal thickening of the bilateral maxillary sinuses is noted. SOFT TISSUES/SKULL:  Nondisplaced fractures of the bilateral nasal and lacrimal bones are seen, of uncertain age.  Cervical spine: BONES/ALIGNMENT: There is no acute fracture or traumatic malalignment. DEGENERATIVE CHANGES: No significant degenerative changes. SOFT TISSUES: There is no prevertebral soft tissue swelling. Ground-glass densities in the lung apices are partially included     No acute intracranial abnormality. No acute fracture or subluxation in the cervical spine. Ground-glass densities in the lung apices, due to aspiration, contusion, ARDS, or multifocal pneumonia. CT CERVICAL SPINE WO CONTRAST    Result Date: 7/22/2022  EXAMINATION: CT OF THE HEAD WITHOUT CONTRAST; CT OF THE CERVICAL SPINE WITHOUT CONTRAST 7/22/2022 1:24 am TECHNIQUE: CT of the head was performed without the administration of intravenous contrast. Automated exposure control, iterative reconstruction, and/or weight based adjustment of the mA/kV was utilized to reduce the radiation dose to as low as reasonably achievable.; CT of the cervical spine was performed without the administration of intravenous contrast. Multiplanar reformatted images are provided for review. Automated exposure control, iterative reconstruction, and/or weight based adjustment of the mA/kV was utilized to reduce the radiation dose to as low as reasonably achievable. COMPARISON: None. HISTORY: ORDERING SYSTEM PROVIDED HISTORY: Headache and neck pain after fall TECHNOLOGIST PROVIDED HISTORY: Has a \"code stroke\" or \"stroke alert\" been called? ->No Reason for exam:->Evaluate intracranial abnormality Decision Support Exception - unselect if not a suspected or confirmed emergency medical condition->Emergency Medical Condition (MA) What reading provider will be dictating this exam?->CRC FINDINGS: Head: BRAIN/VENTRICLES: There is no acute intracranial hemorrhage, mass effect or midline shift. No abnormal extra-axial fluid collection. The gray-white differentiation is maintained without evidence of an acute infarct. There is no evidence of hydrocephalus. ORBITS: The bilateral globes are intact.  SINUSES: There is a polyp versus retention cyst in the left maxillary sinus. Minimal mucoperiosteal thickening of the bilateral maxillary sinuses is noted. SOFT TISSUES/SKULL:  Nondisplaced fractures of the bilateral nasal and lacrimal bones are seen, of uncertain age. Cervical spine: BONES/ALIGNMENT: There is no acute fracture or traumatic malalignment. DEGENERATIVE CHANGES: No significant degenerative changes. SOFT TISSUES: There is no prevertebral soft tissue swelling. Ground-glass densities in the lung apices are partially included     No acute intracranial abnormality. No acute fracture or subluxation in the cervical spine. Ground-glass densities in the lung apices, due to aspiration, contusion, ARDS, or multifocal pneumonia. XR CHEST PORTABLE    Result Date: 7/22/2022  EXAMINATION: ONE XRAY VIEW OF THE CHEST 7/22/2022 1:32 am COMPARISON: None. HISTORY: ORDERING SYSTEM PROVIDED HISTORY: overdose TECHNOLOGIST PROVIDED HISTORY: Reason for exam:->overdose What reading provider will be dictating this exam?->CRC FINDINGS: The lungs are without acute focal process. There is no effusion or pneumothorax. The cardiomediastinal silhouette is without acute process. The osseous structures are without acute process. No acute process. Discharge Medications:      Medication List        START taking these medications      amoxicillin-clavulanate 875-125 MG per tablet  Commonly known as: AUGMENTIN  Take 1 tablet by mouth in the morning and 1 tablet before bedtime. Do all this for 7 days.      ibuprofen 600 MG tablet  Commonly known as: ADVIL;MOTRIN  Take 1 tablet by mouth every 8 hours as needed for Pain               Where to Get Your Medications        You can get these medications from any pharmacy    Bring a paper prescription for each of these medications  amoxicillin-clavulanate 875-125 MG per tablet  ibuprofen 600 MG tablet         Time Spent on discharge is more than 45 minutes in the examination, evaluation, counseling and review of medications and discharge plan.    +++++++++++++++++++++++++++++++++++++++++++++++++  Janis Hernández MD  [address]  +++++++++++++++++++++++++++++++++++++++++++++++++  NOTE: This report was transcribed using voice recognition software. Every effort was made to ensure accuracy; however, inadvertent computerized transcription errors may be present.

## 2022-07-27 LAB
BLOOD CULTURE, ROUTINE: ABNORMAL
CULTURE, RESPIRATORY: ABNORMAL
CULTURE, RESPIRATORY: ABNORMAL
ORGANISM: ABNORMAL
SMEAR, RESPIRATORY: ABNORMAL

## 2022-07-28 NOTE — CARE COORDINATION
This chart was opened by this CM for Jessica Oropeza with [facility] CM, needed discharge date.